# Patient Record
Sex: MALE | Race: WHITE | NOT HISPANIC OR LATINO | Employment: FULL TIME | URBAN - METROPOLITAN AREA
[De-identification: names, ages, dates, MRNs, and addresses within clinical notes are randomized per-mention and may not be internally consistent; named-entity substitution may affect disease eponyms.]

---

## 2023-06-02 ENCOUNTER — APPOINTMENT (OUTPATIENT)
Dept: PRIMARY CARE | Facility: CLINIC | Age: 53
End: 2023-06-02
Payer: COMMERCIAL

## 2023-12-19 ENCOUNTER — OFFICE VISIT (OUTPATIENT)
Dept: PRIMARY CARE | Facility: CLINIC | Age: 53
End: 2023-12-19
Payer: COMMERCIAL

## 2023-12-19 VITALS
SYSTOLIC BLOOD PRESSURE: 110 MMHG | WEIGHT: 211 LBS | BODY MASS INDEX: 28.58 KG/M2 | HEIGHT: 72 IN | OXYGEN SATURATION: 97 % | TEMPERATURE: 97.6 F | HEART RATE: 74 BPM | DIASTOLIC BLOOD PRESSURE: 70 MMHG

## 2023-12-19 DIAGNOSIS — E78.00 PURE HYPERCHOLESTEROLEMIA: ICD-10-CM

## 2023-12-19 DIAGNOSIS — Z12.5 SCREENING FOR PROSTATE CANCER: ICD-10-CM

## 2023-12-19 DIAGNOSIS — J68.9: ICD-10-CM

## 2023-12-19 DIAGNOSIS — R73.09 ABNORMAL GLUCOSE: ICD-10-CM

## 2023-12-19 DIAGNOSIS — R91.1 PULMONARY NODULE, LEFT: Primary | ICD-10-CM

## 2023-12-19 DIAGNOSIS — E55.9 VITAMIN D DEFICIENCY: ICD-10-CM

## 2023-12-19 DIAGNOSIS — Z13.29 THYROID DISORDER SCREENING: ICD-10-CM

## 2023-12-19 PROCEDURE — 1036F TOBACCO NON-USER: CPT | Performed by: INTERNAL MEDICINE

## 2023-12-19 PROCEDURE — 99214 OFFICE O/P EST MOD 30 MIN: CPT | Performed by: INTERNAL MEDICINE

## 2023-12-19 ASSESSMENT — PAIN SCALES - GENERAL: PAINLEVEL: 0-NO PAIN

## 2023-12-19 NOTE — PROGRESS NOTES
Subjective   Patient ID: Kulwinder Starks is a 53 y.o. male who presents for Results (Discuss sleep study and lung nodule).    HPI   Patient presented to the office because he ws told to have pulmonary nodule on left side 2 years ago and did not have follow up after that.    Review of Systems   Constitutional:  Negative for activity change, appetite change, fatigue and fever.   HENT:  Negative for congestion, dental problem, ear pain, hearing loss, rhinorrhea, sinus pressure, sinus pain, trouble swallowing and voice change.    Eyes:  Negative for photophobia, discharge, redness and visual disturbance.   Respiratory:  Negative for cough, choking, chest tightness, shortness of breath, wheezing and stridor.    Cardiovascular:  Negative for chest pain, palpitations and leg swelling.   Gastrointestinal:  Negative for abdominal distention, abdominal pain, blood in stool, constipation, diarrhea, nausea and vomiting.   Endocrine: Negative for polydipsia, polyphagia and polyuria.   Genitourinary:  Negative for difficulty urinating, dysuria, flank pain, frequency, hematuria and urgency.   Musculoskeletal:  Negative for arthralgias, back pain, gait problem, joint swelling, myalgias, neck pain and neck stiffness.   Skin:  Negative for color change, rash and wound.   Allergic/Immunologic: Negative for immunocompromised state.   Neurological:  Negative for dizziness, tremors, seizures, syncope, facial asymmetry, speech difficulty, weakness, light-headedness, numbness and headaches.   Hematological:  Does not bruise/bleed easily.   Psychiatric/Behavioral:  Negative for behavioral problems, confusion, dysphoric mood, hallucinations, self-injury, sleep disturbance and suicidal ideas. The patient is not nervous/anxious.      Objective   /70   Pulse 74   Temp 36.4 °C (97.6 °F)   Ht 1.829 m (6')   Wt 95.7 kg (211 lb)   SpO2 97%   BMI 28.62 kg/m²     Physical Exam  Constitutional:       General: He is not in acute  distress.     Appearance: Normal appearance. He is not ill-appearing or toxic-appearing.   HENT:      Head: Normocephalic and atraumatic.      Nose: Nose normal. No congestion or rhinorrhea.      Mouth/Throat:      Mouth: Mucous membranes are moist.   Eyes:      General: No scleral icterus.     Extraocular Movements: Extraocular movements intact.      Conjunctiva/sclera: Conjunctivae normal.      Pupils: Pupils are equal, round, and reactive to light.   Cardiovascular:      Rate and Rhythm: Normal rate and regular rhythm.      Pulses: Normal pulses.      Heart sounds: Normal heart sounds. No murmur heard.  Pulmonary:      Effort: Pulmonary effort is normal. No respiratory distress.      Breath sounds: Normal breath sounds. No stridor. No wheezing, rhonchi or rales.   Abdominal:      General: Abdomen is flat. Bowel sounds are normal.      Palpations: Abdomen is soft.      Tenderness: There is no abdominal tenderness. There is no right CVA tenderness or left CVA tenderness.   Musculoskeletal:         General: No swelling or deformity. Normal range of motion.      Cervical back: Normal range of motion and neck supple. No rigidity or tenderness.      Right lower leg: No edema.      Left lower leg: No edema.   Lymphadenopathy:      Cervical: No cervical adenopathy.   Skin:     General: Skin is warm.      Coloration: Skin is not jaundiced.      Findings: No erythema or lesion.   Neurological:      General: No focal deficit present.      Mental Status: He is alert and oriented to person, place, and time.      Cranial Nerves: No cranial nerve deficit.      Motor: No weakness.      Coordination: Coordination normal.      Gait: Gait normal.   Psychiatric:         Mood and Affect: Mood normal.         Behavior: Behavior normal.         Thought Content: Thought content normal.         Judgment: Judgment normal.       Assessment/Plan   Problem List Items Addressed This Visit          Cardiac and Vasculature    Pure  hypercholesterolemia     Last Lipid panel was checked on 1/27/2023. LDL level of 159 and TG level of 114 and patient need repeat labs.         Relevant Orders    Lipid Panel    Comprehensive Metabolic Panel    CBC and Auto Differential       Endocrine/Metabolic    Vitamin D deficiency     Vitamin D level of 25 and need repeat labs.         Relevant Orders    Vitamin D 25-Hydroxy,Total (for eval of Vitamin D levels)       Pulmonary and Pneumonias    RESOLVED: Unspecified respiratory condition due to chemicals, gases, fumes and vapors (CMS/HCC)     Other Visit Diagnoses       Pulmonary nodule, left    -  Primary    Relevant Orders    CT chest wo IV contrast    Abnormal glucose        Relevant Orders    Hemoglobin A1C    Screening for prostate cancer        Relevant Orders    PSA, Total and Free    Thyroid disorder screening        Relevant Orders    TSH with reflex to Free T4 if abnormal

## 2023-12-23 PROBLEM — E78.00 PURE HYPERCHOLESTEROLEMIA: Status: ACTIVE | Noted: 2023-12-23

## 2023-12-23 PROBLEM — E55.9 VITAMIN D DEFICIENCY: Status: ACTIVE | Noted: 2023-12-23

## 2023-12-23 ASSESSMENT — ENCOUNTER SYMPTOMS
NECK PAIN: 0
EYE DISCHARGE: 0
SPEECH DIFFICULTY: 0
POLYPHAGIA: 0
ARTHRALGIAS: 0
SINUS PRESSURE: 0
STRIDOR: 0
CONSTIPATION: 0
APPETITE CHANGE: 0
TREMORS: 0
DYSURIA: 0
LIGHT-HEADEDNESS: 0
MYALGIAS: 0
DEPRESSION: 0
NERVOUS/ANXIOUS: 0
ABDOMINAL PAIN: 0
JOINT SWELLING: 0
HEMATURIA: 0
BLOOD IN STOOL: 0
CHEST TIGHTNESS: 0
NECK STIFFNESS: 0
FREQUENCY: 0
PALPITATIONS: 0
DYSPHORIC MOOD: 0
BACK PAIN: 0
NAUSEA: 0
ABDOMINAL DISTENTION: 0
HALLUCINATIONS: 0
SINUS PAIN: 0
WOUND: 0
PHOTOPHOBIA: 0
WEAKNESS: 0
CHOKING: 0
SLEEP DISTURBANCE: 0
POLYDIPSIA: 0
FLANK PAIN: 0
DIARRHEA: 0
OCCASIONAL FEELINGS OF UNSTEADINESS: 0
DIZZINESS: 0
COUGH: 0
SEIZURES: 0
TROUBLE SWALLOWING: 0
CONFUSION: 0
BRUISES/BLEEDS EASILY: 0
LOSS OF SENSATION IN FEET: 0
FATIGUE: 0
VOICE CHANGE: 0
RHINORRHEA: 0
NUMBNESS: 0
EYE REDNESS: 0
WHEEZING: 0
DIFFICULTY URINATING: 0
COLOR CHANGE: 0
VOMITING: 0
ACTIVITY CHANGE: 0
FEVER: 0
SHORTNESS OF BREATH: 0
HEADACHES: 0
FACIAL ASYMMETRY: 0

## 2023-12-23 ASSESSMENT — PATIENT HEALTH QUESTIONNAIRE - PHQ9
2. FEELING DOWN, DEPRESSED OR HOPELESS: NOT AT ALL
SUM OF ALL RESPONSES TO PHQ9 QUESTIONS 1 AND 2: 0
1. LITTLE INTEREST OR PLEASURE IN DOING THINGS: NOT AT ALL

## 2023-12-24 NOTE — ASSESSMENT & PLAN NOTE
Last Lipid panel was checked on 1/27/2023. LDL level of 159 and TG level of 114 and patient need repeat labs.

## 2024-01-05 ENCOUNTER — HOSPITAL ENCOUNTER (OUTPATIENT)
Dept: RADIOLOGY | Facility: HOSPITAL | Age: 54
Discharge: HOME | End: 2024-01-05
Payer: COMMERCIAL

## 2024-01-05 DIAGNOSIS — R91.1 PULMONARY NODULE, LEFT: ICD-10-CM

## 2024-01-05 PROCEDURE — 71250 CT THORAX DX C-: CPT

## 2024-01-05 PROCEDURE — 71250 CT THORAX DX C-: CPT | Performed by: RADIOLOGY

## 2024-03-12 ENCOUNTER — TELEPHONE (OUTPATIENT)
Dept: PRIMARY CARE | Facility: CLINIC | Age: 54
End: 2024-03-12
Payer: COMMERCIAL

## 2024-03-12 NOTE — TELEPHONE ENCOUNTER
Patient left a vm stating he recently tested pos for covid, would like recommendations on what to do or get a prescription for something. Stated he's on day 4 and not getting any better.

## 2024-03-13 DIAGNOSIS — U07.1 COVID-19 VIRUS INFECTION: Primary | ICD-10-CM

## 2024-03-13 RX ORDER — NIRMATRELVIR AND RITONAVIR 300-100 MG
3 KIT ORAL 2 TIMES DAILY
Qty: 30 TABLET | Refills: 0 | Status: SHIPPED | OUTPATIENT
Start: 2024-03-13 | End: 2024-03-18

## 2024-03-13 NOTE — TELEPHONE ENCOUNTER
I sent the medicine to the pharmacy so he can start it right away but he need to do virtual with me as I saw him more than 3 months ago and I need to document it officially in the chart.

## 2025-01-21 ENCOUNTER — APPOINTMENT (OUTPATIENT)
Dept: PRIMARY CARE | Facility: CLINIC | Age: 55
End: 2025-01-21
Payer: COMMERCIAL

## 2025-01-21 ENCOUNTER — LAB (OUTPATIENT)
Dept: LAB | Facility: LAB | Age: 55
End: 2025-01-21
Payer: COMMERCIAL

## 2025-01-21 VITALS
SYSTOLIC BLOOD PRESSURE: 130 MMHG | WEIGHT: 210.2 LBS | HEIGHT: 71 IN | OXYGEN SATURATION: 99 % | TEMPERATURE: 97.5 F | DIASTOLIC BLOOD PRESSURE: 90 MMHG | BODY MASS INDEX: 29.43 KG/M2 | HEART RATE: 75 BPM

## 2025-01-21 DIAGNOSIS — E55.9 VITAMIN D DEFICIENCY: ICD-10-CM

## 2025-01-21 DIAGNOSIS — E78.00 PURE HYPERCHOLESTEROLEMIA: ICD-10-CM

## 2025-01-21 DIAGNOSIS — Z00.00 ENCOUNTER FOR ANNUAL PHYSICAL EXAM: Primary | ICD-10-CM

## 2025-01-21 DIAGNOSIS — J68.9: ICD-10-CM

## 2025-01-21 DIAGNOSIS — Z00.00 ENCOUNTER FOR ANNUAL PHYSICAL EXAM: ICD-10-CM

## 2025-01-21 LAB
ALBUMIN SERPL BCP-MCNC: 4.6 G/DL (ref 3.4–5)
ALP SERPL-CCNC: 55 U/L (ref 33–120)
ALT SERPL W P-5'-P-CCNC: 24 U/L (ref 10–52)
ANION GAP SERPL CALC-SCNC: 12 MMOL/L (ref 10–20)
APPEARANCE UR: CLEAR
AST SERPL W P-5'-P-CCNC: 21 U/L (ref 9–39)
BASOPHILS # BLD AUTO: 0.04 X10*3/UL (ref 0–0.1)
BASOPHILS NFR BLD AUTO: 0.5 %
BILIRUB SERPL-MCNC: 1.9 MG/DL (ref 0–1.2)
BILIRUB UR STRIP.AUTO-MCNC: NEGATIVE MG/DL
BUN SERPL-MCNC: 14 MG/DL (ref 6–23)
CALCIUM SERPL-MCNC: 9.4 MG/DL (ref 8.6–10.3)
CHLORIDE SERPL-SCNC: 102 MMOL/L (ref 98–107)
CHOLEST SERPL-MCNC: 215 MG/DL (ref 0–199)
CHOLESTEROL/HDL RATIO: 4.2
CO2 SERPL-SCNC: 28 MMOL/L (ref 21–32)
COLOR UR: YELLOW
CREAT SERPL-MCNC: 1.12 MG/DL (ref 0.5–1.3)
EGFRCR SERPLBLD CKD-EPI 2021: 79 ML/MIN/1.73M*2
EOSINOPHIL # BLD AUTO: 0.13 X10*3/UL (ref 0–0.7)
EOSINOPHIL NFR BLD AUTO: 1.7 %
ERYTHROCYTE [DISTWIDTH] IN BLOOD BY AUTOMATED COUNT: 13.1 % (ref 11.5–14.5)
GLUCOSE SERPL-MCNC: 81 MG/DL (ref 74–99)
GLUCOSE UR STRIP.AUTO-MCNC: NORMAL MG/DL
HCT VFR BLD AUTO: 44.9 % (ref 41–52)
HDLC SERPL-MCNC: 50.9 MG/DL
HGB BLD-MCNC: 15.4 G/DL (ref 13.5–17.5)
IMM GRANULOCYTES # BLD AUTO: 0.03 X10*3/UL (ref 0–0.7)
IMM GRANULOCYTES NFR BLD AUTO: 0.4 % (ref 0–0.9)
KETONES UR STRIP.AUTO-MCNC: NEGATIVE MG/DL
LDLC SERPL CALC-MCNC: 142 MG/DL
LEUKOCYTE ESTERASE UR QL STRIP.AUTO: NEGATIVE
LYMPHOCYTES # BLD AUTO: 1.89 X10*3/UL (ref 1.2–4.8)
LYMPHOCYTES NFR BLD AUTO: 25 %
MCH RBC QN AUTO: 30.4 PG (ref 26–34)
MCHC RBC AUTO-ENTMCNC: 34.3 G/DL (ref 32–36)
MCV RBC AUTO: 89 FL (ref 80–100)
MONOCYTES # BLD AUTO: 0.71 X10*3/UL (ref 0.1–1)
MONOCYTES NFR BLD AUTO: 9.4 %
MUCOUS THREADS #/AREA URNS AUTO: NORMAL /LPF
NEUTROPHILS # BLD AUTO: 4.77 X10*3/UL (ref 1.2–7.7)
NEUTROPHILS NFR BLD AUTO: 63 %
NITRITE UR QL STRIP.AUTO: NEGATIVE
NON HDL CHOLESTEROL: 164 MG/DL (ref 0–149)
NRBC BLD-RTO: 0 /100 WBCS (ref 0–0)
PH UR STRIP.AUTO: 6 [PH]
PLATELET # BLD AUTO: 231 X10*3/UL (ref 150–450)
POTASSIUM SERPL-SCNC: 4.3 MMOL/L (ref 3.5–5.3)
PROT SERPL-MCNC: 7.1 G/DL (ref 6.4–8.2)
PROT UR STRIP.AUTO-MCNC: NORMAL MG/DL
RBC # BLD AUTO: 5.06 X10*6/UL (ref 4.5–5.9)
RBC # UR STRIP.AUTO: NEGATIVE /UL
RBC #/AREA URNS AUTO: NORMAL /HPF
SODIUM SERPL-SCNC: 138 MMOL/L (ref 136–145)
SP GR UR STRIP.AUTO: 1.02
TRIGL SERPL-MCNC: 110 MG/DL (ref 0–149)
TSH SERPL-ACNC: 0.76 MIU/L (ref 0.44–3.98)
UROBILINOGEN UR STRIP.AUTO-MCNC: NORMAL MG/DL
VLDL: 22 MG/DL (ref 0–40)
WBC # BLD AUTO: 7.6 X10*3/UL (ref 4.4–11.3)
WBC #/AREA URNS AUTO: NORMAL /HPF

## 2025-01-21 PROCEDURE — 36415 COLL VENOUS BLD VENIPUNCTURE: CPT

## 2025-01-21 PROCEDURE — 82306 VITAMIN D 25 HYDROXY: CPT

## 2025-01-21 PROCEDURE — 99213 OFFICE O/P EST LOW 20 MIN: CPT | Performed by: INTERNAL MEDICINE

## 2025-01-21 PROCEDURE — 84154 ASSAY OF PSA FREE: CPT

## 2025-01-21 PROCEDURE — 80053 COMPREHEN METABOLIC PANEL: CPT

## 2025-01-21 PROCEDURE — 99396 PREV VISIT EST AGE 40-64: CPT | Performed by: INTERNAL MEDICINE

## 2025-01-21 PROCEDURE — 84153 ASSAY OF PSA TOTAL: CPT

## 2025-01-21 PROCEDURE — 1036F TOBACCO NON-USER: CPT | Performed by: INTERNAL MEDICINE

## 2025-01-21 PROCEDURE — 3008F BODY MASS INDEX DOCD: CPT | Performed by: INTERNAL MEDICINE

## 2025-01-21 PROCEDURE — 85025 COMPLETE CBC W/AUTO DIFF WBC: CPT

## 2025-01-21 PROCEDURE — 83036 HEMOGLOBIN GLYCOSYLATED A1C: CPT

## 2025-01-21 PROCEDURE — 81001 URINALYSIS AUTO W/SCOPE: CPT

## 2025-01-21 PROCEDURE — 84443 ASSAY THYROID STIM HORMONE: CPT

## 2025-01-21 PROCEDURE — 80061 LIPID PANEL: CPT

## 2025-01-21 ASSESSMENT — ENCOUNTER SYMPTOMS
NUMBNESS: 0
COUGH: 0
WEAKNESS: 0
SLEEP DISTURBANCE: 0
DYSURIA: 0
BRUISES/BLEEDS EASILY: 0
NERVOUS/ANXIOUS: 0
COLOR CHANGE: 0
CHEST TIGHTNESS: 0
WOUND: 0
FEVER: 0
PHOTOPHOBIA: 0
SPEECH DIFFICULTY: 0
CONFUSION: 0
ACTIVITY CHANGE: 0
TROUBLE SWALLOWING: 0
CONSTIPATION: 0
POLYDIPSIA: 0
FLANK PAIN: 0
CHOKING: 0
DIARRHEA: 0
BLOOD IN STOOL: 0
APPETITE CHANGE: 0
DECREASED CONCENTRATION: 0
DYSPHORIC MOOD: 0
PALPITATIONS: 0
RHINORRHEA: 0
VOMITING: 0
BACK PAIN: 0
SINUS PAIN: 0
DIZZINESS: 0
STRIDOR: 0
ABDOMINAL PAIN: 0
HEADACHES: 0
ABDOMINAL DISTENTION: 0
HEMATURIA: 0
SEIZURES: 0
TREMORS: 0
ARTHRALGIAS: 0
FACIAL ASYMMETRY: 0
NECK STIFFNESS: 0
WHEEZING: 0
SORE THROAT: 0
EYE DISCHARGE: 0
HALLUCINATIONS: 0
NAUSEA: 0
MYALGIAS: 0
FREQUENCY: 0
FATIGUE: 0
POLYPHAGIA: 0
VOICE CHANGE: 0
EYE REDNESS: 0
DIFFICULTY URINATING: 0
SHORTNESS OF BREATH: 0
SINUS PRESSURE: 0

## 2025-01-21 ASSESSMENT — PATIENT HEALTH QUESTIONNAIRE - PHQ9
2. FEELING DOWN, DEPRESSED OR HOPELESS: SEVERAL DAYS
1. LITTLE INTEREST OR PLEASURE IN DOING THINGS: SEVERAL DAYS
SUM OF ALL RESPONSES TO PHQ9 QUESTIONS 1 AND 2: 2

## 2025-01-21 ASSESSMENT — ANXIETY QUESTIONNAIRES
4. TROUBLE RELAXING: NOT AT ALL
3. WORRYING TOO MUCH ABOUT DIFFERENT THINGS: NOT AT ALL
7. FEELING AFRAID AS IF SOMETHING AWFUL MIGHT HAPPEN: NOT AT ALL
6. BECOMING EASILY ANNOYED OR IRRITABLE: NOT AT ALL
1. FEELING NERVOUS, ANXIOUS, OR ON EDGE: NOT AT ALL
GAD7 TOTAL SCORE: 0
5. BEING SO RESTLESS THAT IT IS HARD TO SIT STILL: NOT AT ALL
2. NOT BEING ABLE TO STOP OR CONTROL WORRYING: NOT AT ALL

## 2025-01-21 ASSESSMENT — PAIN SCALES - GENERAL: PAINLEVEL_OUTOF10: 0-NO PAIN

## 2025-01-21 NOTE — PROGRESS NOTES
"Subjective   Patient ID: Kulwinder Starks is a 54 y.o. male who presents for Annual Exam.    HPI   Patient presented to the office for annual physical exam.  Last time his labs were suggestive of     Vitamin D deficiency- Level was 25  Pure hypercholesterolemia- LDL level was 159  Patient is not on Vitamin D supplement or cholesterol medication.  He is eating healthy and active lifestyle.    Review of Systems   Constitutional:  Negative for activity change, appetite change, fatigue and fever.   HENT:  Negative for congestion, dental problem, ear pain, hearing loss, rhinorrhea, sinus pressure, sinus pain, sore throat, trouble swallowing and voice change.    Eyes:  Negative for photophobia, discharge, redness and visual disturbance.   Respiratory:  Negative for cough, choking, chest tightness, shortness of breath, wheezing and stridor.    Cardiovascular:  Negative for chest pain, palpitations and leg swelling.   Gastrointestinal:  Negative for abdominal distention, abdominal pain, blood in stool, constipation, diarrhea, nausea and vomiting.   Endocrine: Negative for polydipsia, polyphagia and polyuria.   Genitourinary:  Negative for difficulty urinating, dysuria, flank pain, frequency, hematuria and urgency.   Musculoskeletal:  Negative for arthralgias, back pain, gait problem, myalgias and neck stiffness.   Skin:  Negative for color change, rash and wound.   Allergic/Immunologic: Negative for immunocompromised state.   Neurological:  Negative for dizziness, tremors, seizures, syncope, facial asymmetry, speech difficulty, weakness, numbness and headaches.   Hematological:  Does not bruise/bleed easily.   Psychiatric/Behavioral:  Negative for behavioral problems, confusion, decreased concentration, dysphoric mood, hallucinations, sleep disturbance and suicidal ideas. The patient is not nervous/anxious.      Objective   /90   Pulse 75   Temp 36.4 °C (97.5 °F)   Ht 1.806 m (5' 11.1\")   Wt 95.3 kg (210 lb 3.2 " oz)   SpO2 99%   BMI 29.23 kg/m²     Physical Exam  Vitals and nursing note reviewed.   Constitutional:       General: He is not in acute distress.     Appearance: Normal appearance. He is not ill-appearing or toxic-appearing.   HENT:      Head: Normocephalic and atraumatic.      Nose: Nose normal. No congestion or rhinorrhea.      Mouth/Throat:      Mouth: Mucous membranes are moist.   Eyes:      General: No scleral icterus.     Extraocular Movements: Extraocular movements intact.      Conjunctiva/sclera: Conjunctivae normal.      Pupils: Pupils are equal, round, and reactive to light.   Cardiovascular:      Rate and Rhythm: Normal rate and regular rhythm.      Pulses: Normal pulses.      Heart sounds: Normal heart sounds. No murmur heard.     No gallop.   Pulmonary:      Effort: Pulmonary effort is normal. No respiratory distress.      Breath sounds: Normal breath sounds. No stridor. No wheezing, rhonchi or rales.   Abdominal:      General: Abdomen is flat. Bowel sounds are normal.      Palpations: Abdomen is soft.      Tenderness: There is no abdominal tenderness. There is no right CVA tenderness, left CVA tenderness, guarding or rebound.   Musculoskeletal:         General: No swelling or deformity. Normal range of motion.      Cervical back: Normal range of motion and neck supple. No rigidity or tenderness.      Right lower leg: No edema.      Left lower leg: No edema.   Lymphadenopathy:      Cervical: No cervical adenopathy.   Skin:     General: Skin is warm.      Coloration: Skin is not jaundiced.      Findings: No erythema or lesion.   Neurological:      General: No focal deficit present.      Mental Status: He is alert and oriented to person, place, and time.      Cranial Nerves: No cranial nerve deficit.      Motor: No weakness.      Coordination: Coordination normal.      Gait: Gait normal.   Psychiatric:         Mood and Affect: Mood normal.         Behavior: Behavior normal.         Thought Content:  Thought content normal.         Judgment: Judgment normal.       Assessment/Plan   Problem List Items Addressed This Visit       Unspecified respiratory condition due to chemicals, gases, fumes and vapors (Multi)     Stable.  No physical findings.         Pure hypercholesterolemia    Relevant Orders    Lipid Panel    Vitamin D deficiency    Relevant Orders    Vitamin D 25-Hydroxy,Total (for eval of Vitamin D levels)     Other Visit Diagnoses       Encounter for annual physical exam    -  Primary    Relevant Orders    CBC and Auto Differential    Comprehensive Metabolic Panel    Hemoglobin A1C    PSA, Total and Free    TSH with reflex to Free T4 if abnormal    Urinalysis with Reflex Microscopic

## 2025-01-22 LAB
25(OH)D3 SERPL-MCNC: 28 NG/ML (ref 30–100)
EST. AVERAGE GLUCOSE BLD GHB EST-MCNC: 91 MG/DL
HBA1C MFR BLD: 4.8 %

## 2025-01-23 LAB
PSA FREE MFR SERPL: 33 %
PSA FREE SERPL-MCNC: 0.5 NG/ML
PSA SERPL IA-MCNC: 1.5 NG/ML (ref 0–4)

## 2025-04-08 PROBLEM — G47.33 OBSTRUCTIVE SLEEP APNEA SYNDROME: Status: ACTIVE | Noted: 2025-04-08

## 2025-04-24 ENCOUNTER — TELEPHONE (OUTPATIENT)
Dept: PRIMARY CARE | Facility: CLINIC | Age: 55
End: 2025-04-24
Payer: COMMERCIAL

## 2025-05-05 NOTE — TELEPHONE ENCOUNTER
Patient called again, wondering if there is any progress on his cpap.  I let him know  is just back today.